# Patient Record
Sex: MALE | Race: WHITE | URBAN - METROPOLITAN AREA
[De-identification: names, ages, dates, MRNs, and addresses within clinical notes are randomized per-mention and may not be internally consistent; named-entity substitution may affect disease eponyms.]

---

## 2021-10-01 ENCOUNTER — LAB OUTSIDE AN ENCOUNTER (OUTPATIENT)
Dept: URBAN - METROPOLITAN AREA TELEHEALTH 2 | Facility: TELEHEALTH | Age: 51
End: 2021-10-01

## 2021-10-01 ENCOUNTER — OFFICE VISIT (OUTPATIENT)
Dept: URBAN - METROPOLITAN AREA CLINIC 19 | Facility: CLINIC | Age: 51
End: 2021-10-01

## 2021-10-01 ENCOUNTER — OFFICE VISIT (OUTPATIENT)
Dept: URBAN - METROPOLITAN AREA TELEHEALTH 2 | Facility: TELEHEALTH | Age: 51
End: 2021-10-01
Payer: MEDICARE

## 2021-10-01 ENCOUNTER — TELEPHONE ENCOUNTER (OUTPATIENT)
Dept: URBAN - METROPOLITAN AREA CLINIC 19 | Facility: CLINIC | Age: 51
End: 2021-10-01

## 2021-10-01 DIAGNOSIS — Z12.11 COLON CANCER SCREENING: ICD-10-CM

## 2021-10-01 PROCEDURE — 99242 OFF/OP CONSLTJ NEW/EST SF 20: CPT | Performed by: NURSE PRACTITIONER

## 2021-10-01 PROCEDURE — 993 AGA: Performed by: NURSE PRACTITIONER

## 2021-10-01 NOTE — HPI-TODAY'S VISIT:
The patient was referred by Dr. Marilynn Calderon for colon cancer screening.  A copy of this document is being forwarded to the referring provider.     Mr. Huang is a 50 year-old male who presents for a colon cancer screening. Last colonoscopy: 10 years ago in [place]. Unsure why he had it done- doesn't think he had any polyps. No family history of colon polyps or colon cancer. Patient denies nausea, heartburn, dysphagia, abdominal pain, rectal bleeding, unintentional weight loss, and loss of appetite. Has hemorrhoids. He has a bowel movement everyday, which is soft and formed. He denies pacemaker/defibrillator, diabetes, kidney disease, and home O2. Reports BMI of 36 (6' 4" and 300lb). Reports he sees cardiologist Dr. Rizo and is on Xarelto for a fib.

## 2022-05-11 ENCOUNTER — CLAIMS CREATED FROM THE CLAIM WINDOW (OUTPATIENT)
Dept: URBAN - METROPOLITAN AREA MEDICAL CENTER 25 | Facility: MEDICAL CENTER | Age: 52
End: 2022-05-11
Payer: MEDICARE

## 2022-05-11 ENCOUNTER — CLAIMS CREATED FROM THE CLAIM WINDOW (OUTPATIENT)
Dept: URBAN - METROPOLITAN AREA MEDICAL CENTER 25 | Facility: MEDICAL CENTER | Age: 52
End: 2022-05-11

## 2022-05-11 DIAGNOSIS — R19.7 ACUTE DIARRHEA: ICD-10-CM

## 2022-05-11 DIAGNOSIS — I48.91 A-FIB: ICD-10-CM

## 2022-05-11 DIAGNOSIS — R55 ATYPICAL SYNCOPE: ICD-10-CM

## 2022-05-11 DIAGNOSIS — R10.13 ABDOMINAL DISCOMFORT, EPIGASTRIC: ICD-10-CM

## 2022-05-11 DIAGNOSIS — R11.2 ACUTE NAUSEA WITH NONBILIOUS VOMITING: ICD-10-CM

## 2022-05-11 PROCEDURE — 99223 1ST HOSP IP/OBS HIGH 75: CPT | Performed by: INTERNAL MEDICINE

## 2022-05-11 PROCEDURE — G8427 DOCREV CUR MEDS BY ELIG CLIN: HCPCS | Performed by: INTERNAL MEDICINE

## 2022-05-11 PROCEDURE — 99233 SBSQ HOSP IP/OBS HIGH 50: CPT | Performed by: INTERNAL MEDICINE

## 2022-05-13 ENCOUNTER — CLAIMS CREATED FROM THE CLAIM WINDOW (OUTPATIENT)
Dept: URBAN - METROPOLITAN AREA MEDICAL CENTER 25 | Facility: MEDICAL CENTER | Age: 52
End: 2022-05-13
Payer: MEDICARE

## 2022-05-13 ENCOUNTER — OUT OF OFFICE VISIT (OUTPATIENT)
Dept: URBAN - METROPOLITAN AREA MEDICAL CENTER 25 | Facility: MEDICAL CENTER | Age: 52
End: 2022-05-13

## 2022-05-13 DIAGNOSIS — R10.13 ABDOMINAL DISCOMFORT, EPIGASTRIC: ICD-10-CM

## 2022-05-13 DIAGNOSIS — K22.70 BARRETT ESOPHAGUS: ICD-10-CM

## 2022-05-13 DIAGNOSIS — K21.9 ACID REFLUX: ICD-10-CM

## 2022-05-13 PROCEDURE — 43239 EGD BIOPSY SINGLE/MULTIPLE: CPT | Performed by: INTERNAL MEDICINE

## 2022-05-16 ENCOUNTER — OUT OF OFFICE VISIT (OUTPATIENT)
Dept: URBAN - METROPOLITAN AREA MEDICAL CENTER 25 | Facility: MEDICAL CENTER | Age: 52
End: 2022-05-16
Payer: MEDICARE

## 2022-05-16 DIAGNOSIS — R10.84 ABDOMINAL CRAMPING, GENERALIZED: ICD-10-CM

## 2022-05-16 DIAGNOSIS — R19.5 ABNORMAL CONSISTENCY OF STOOL: ICD-10-CM

## 2022-05-16 DIAGNOSIS — D62 ABLA (ACUTE BLOOD LOSS ANEMIA): ICD-10-CM

## 2022-05-16 DIAGNOSIS — K22.70 BARRETT ESOPHAGUS: ICD-10-CM

## 2022-05-16 DIAGNOSIS — R11.2 ACUTE NAUSEA WITH NONBILIOUS VOMITING: ICD-10-CM

## 2022-05-16 PROCEDURE — 99232 SBSQ HOSP IP/OBS MODERATE 35: CPT | Performed by: NURSE PRACTITIONER

## 2022-05-17 ENCOUNTER — OUT OF OFFICE VISIT (OUTPATIENT)
Dept: URBAN - METROPOLITAN AREA MEDICAL CENTER 25 | Facility: MEDICAL CENTER | Age: 52
End: 2022-05-17
Payer: MEDICARE

## 2022-05-17 DIAGNOSIS — R19.5 ABNORMAL CONSISTENCY OF STOOL: ICD-10-CM

## 2022-05-17 DIAGNOSIS — D62 ABLA (ACUTE BLOOD LOSS ANEMIA): ICD-10-CM

## 2022-05-17 PROCEDURE — 45378 DIAGNOSTIC COLONOSCOPY: CPT | Performed by: INTERNAL MEDICINE

## 2022-05-17 PROCEDURE — 91110 GI TRC IMG INTRAL ESOPH-ILE: CPT | Performed by: INTERNAL MEDICINE

## 2022-06-07 ENCOUNTER — WEB ENCOUNTER (OUTPATIENT)
Dept: URBAN - METROPOLITAN AREA TELEHEALTH 2 | Facility: TELEHEALTH | Age: 52
End: 2022-06-07

## 2022-06-10 ENCOUNTER — OFFICE VISIT (OUTPATIENT)
Dept: URBAN - METROPOLITAN AREA TELEHEALTH 2 | Facility: TELEHEALTH | Age: 52
End: 2022-06-10
Payer: MEDICARE

## 2022-06-10 ENCOUNTER — TELEPHONE ENCOUNTER (OUTPATIENT)
Dept: URBAN - METROPOLITAN AREA CLINIC 19 | Facility: CLINIC | Age: 52
End: 2022-06-10

## 2022-06-10 DIAGNOSIS — K21.9 GASTROESOPHAGEAL REFLUX DISEASE WITHOUT ESOPHAGITIS: ICD-10-CM

## 2022-06-10 DIAGNOSIS — D50.0 IRON DEFICIENCY ANEMIA DUE TO CHRONIC BLOOD LOSS: ICD-10-CM

## 2022-06-10 DIAGNOSIS — K22.70 BARRETT'S ESOPHAGUS WITHOUT DYSPLASIA: ICD-10-CM

## 2022-06-10 PROBLEM — 302914006: Status: ACTIVE | Noted: 2022-06-10

## 2022-06-10 PROBLEM — 266435005: Status: ACTIVE | Noted: 2022-06-10

## 2022-06-10 PROCEDURE — 99214 OFFICE O/P EST MOD 30 MIN: CPT | Performed by: NURSE PRACTITIONER

## 2022-06-10 RX ORDER — OMEPRAZOLE 20 MG/1
1 CAPSULE 30 MINUTES BEFORE MORNING MEAL CAPSULE, DELAYED RELEASE ORAL ONCE A DAY
Status: ACTIVE | COMMUNITY

## 2022-06-10 RX ORDER — PANTOPRAZOLE SODIUM 40 MG/1
1 TABLET TABLET, DELAYED RELEASE ORAL TWICE A DAY
Qty: 180 TABLET | Refills: 1 | OUTPATIENT
Start: 2022-06-10

## 2022-06-10 NOTE — HPI-TODAY'S VISIT:
Mr. Huang is a 51-year-old male who is following up today from his recent hospital admission at [facility].  He presented on 5/11 after several syncopal episodes.  He also reported dark diarrhea and red blood with wiping.  Hemoglobin was 12 in March and on admission it was 8.  Patient is on Xarelto for A. fib and occasionally takes ibuprofen/Aleve.  Patient was noted to have heme positive stools.  Patient did report epigastric pain and GERD.  Labs on admission WBC 14.2, Hgb 8.6, HCT 29.8, MCV 67.6, PT 16.5, INR 1.4, bilirubin 0.2, alk phos 136, AST 14, ALT 13.  Hemoglobin at discharge was 7.7 on 5/18. Patient had an EGD with Dr. 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  Biopsied.  Normal stomach and normal examined duodenum.  Advised patient to start Protonix 40 mg daily.  Pathology revealed gastroesophageal junction biopsy with patchy active inflammation and intestinal type (goblet cell) metaplasia.  Negative for dysplasia.  Patient had a colonoscopy on 5/17/2022 with Dr. Espitia-diverticulosis in the sigmoid, descending, and transverse colon.  Internal hemorrhoids.  The examined portion of the ileum was normal. Patient had a PillCam on 5/15/2022 that was read by Dr. Espitia-a tiny nonspecific 1 mm red spot was visualized of doubtful clinical significance.  The PillCam was otherwise unremarkable.  Advised to monitor hemoglobin. Today, patient reports he is feeling much better although he complains of memory loss.  Patient reports he had dark diarrhea for couple months on and off for weeks at a time.  Patient reports he has not seen any since leaving the hospital.  Patient reports he has not had any blood work since leaving the hospital.  Patient states he was advised at discharged to take iron although he reports he had GI upset with it.  Patient is not taking iron now.  Patient reports he currently is having loose to formed bowel movements daily.  Patient was taking Prilosec 40 mg daily prior to going to the hospital and that is what he reports he was taking when he left as well.  Patient also reports he was advised seeing a hematologist but he has not yet.

## 2022-06-28 LAB
FERRITIN, SERUM: 11
HEMATOCRIT: 40.1
HEMOGLOBIN: 11.1
IRON BIND.CAP.(TIBC): 449
IRON SATURATION: 5
IRON: 22
MCH: 20
MCHC: 27.7
MCV: 72
NRBC: (no result)
PLATELETS: 501
RBC: 5.56
RDW: 21.1
UIBC: 427
WBC: 9.5

## 2022-12-06 ENCOUNTER — OUT OF OFFICE VISIT (OUTPATIENT)
Dept: URBAN - METROPOLITAN AREA MEDICAL CENTER 25 | Facility: MEDICAL CENTER | Age: 52
End: 2022-12-06
Payer: MEDICARE

## 2022-12-06 DIAGNOSIS — R10.84 ABDOMINAL CRAMPING, GENERALIZED: ICD-10-CM

## 2022-12-06 DIAGNOSIS — K62.5 ANAL BLEEDING: ICD-10-CM

## 2022-12-06 DIAGNOSIS — R19.7 ACUTE DIARRHEA: ICD-10-CM

## 2022-12-06 DIAGNOSIS — D50.9 ANEMIA: ICD-10-CM

## 2022-12-06 PROCEDURE — 99223 1ST HOSP IP/OBS HIGH 75: CPT | Performed by: STUDENT IN AN ORGANIZED HEALTH CARE EDUCATION/TRAINING PROGRAM

## 2022-12-06 PROCEDURE — G8427 DOCREV CUR MEDS BY ELIG CLIN: HCPCS | Performed by: STUDENT IN AN ORGANIZED HEALTH CARE EDUCATION/TRAINING PROGRAM

## 2022-12-07 ENCOUNTER — OUT OF OFFICE VISIT (OUTPATIENT)
Dept: URBAN - METROPOLITAN AREA MEDICAL CENTER 25 | Facility: MEDICAL CENTER | Age: 52
End: 2022-12-07
Payer: MEDICARE

## 2022-12-07 DIAGNOSIS — K22.70 BARRETT ESOPHAGUS: ICD-10-CM

## 2022-12-07 DIAGNOSIS — K31.89 ACQUIRED DEFORMITY OF DUODENUM: ICD-10-CM

## 2022-12-07 DIAGNOSIS — K92.1 ACUTE MELENA: ICD-10-CM

## 2022-12-07 DIAGNOSIS — D50.0 1. ANEMIA, IRON DEFICIENCY FROM CHRONIC BLOOD LOSS:: ICD-10-CM

## 2022-12-07 PROCEDURE — 43239 EGD BIOPSY SINGLE/MULTIPLE: CPT | Performed by: STUDENT IN AN ORGANIZED HEALTH CARE EDUCATION/TRAINING PROGRAM

## 2022-12-07 PROCEDURE — 45330 DIAGNOSTIC SIGMOIDOSCOPY: CPT | Performed by: STUDENT IN AN ORGANIZED HEALTH CARE EDUCATION/TRAINING PROGRAM

## 2022-12-08 ENCOUNTER — OUT OF OFFICE VISIT (OUTPATIENT)
Dept: URBAN - METROPOLITAN AREA MEDICAL CENTER 25 | Facility: MEDICAL CENTER | Age: 52
End: 2022-12-08
Payer: MEDICARE

## 2022-12-08 DIAGNOSIS — K92.1 ACUTE MELENA: ICD-10-CM

## 2022-12-08 DIAGNOSIS — D50.9 ANEMIA: ICD-10-CM

## 2022-12-08 PROCEDURE — 91110 GI TRC IMG INTRAL ESOPH-ILE: CPT | Performed by: STUDENT IN AN ORGANIZED HEALTH CARE EDUCATION/TRAINING PROGRAM

## 2023-01-05 ENCOUNTER — OFFICE VISIT (OUTPATIENT)
Dept: URBAN - METROPOLITAN AREA TELEHEALTH 2 | Facility: TELEHEALTH | Age: 53
End: 2023-01-05

## 2023-01-05 ENCOUNTER — OFFICE VISIT (OUTPATIENT)
Dept: URBAN - METROPOLITAN AREA TELEHEALTH 2 | Facility: TELEHEALTH | Age: 53
End: 2023-01-05
Payer: MEDICARE

## 2023-01-05 DIAGNOSIS — D50.8 ACQUIRED IRON DEFICIENCY ANEMIA DUE TO DECREASED ABSORPTION: ICD-10-CM

## 2023-01-05 DIAGNOSIS — K64.9 HEMORRHOIDS: ICD-10-CM

## 2023-01-05 DIAGNOSIS — K92.1 HEMATOCHEZIA: ICD-10-CM

## 2023-01-05 PROCEDURE — 99213 OFFICE O/P EST LOW 20 MIN: CPT | Performed by: INTERNAL MEDICINE

## 2023-01-05 RX ORDER — OMEPRAZOLE 20 MG/1
1 CAPSULE 30 MINUTES BEFORE MORNING MEAL CAPSULE, DELAYED RELEASE ORAL ONCE A DAY
Status: ACTIVE | COMMUNITY

## 2023-01-05 RX ORDER — PANTOPRAZOLE SODIUM 40 MG/1
1 TABLET TABLET, DELAYED RELEASE ORAL TWICE A DAY
Qty: 180 TABLET | Refills: 1 | Status: ACTIVE | COMMUNITY
Start: 2022-06-10

## 2023-01-05 NOTE — HPI-TODAY'S VISIT:
6/10/22 (Verónica Castillo, CARLOS): Mr. Huang is a 51-year-old male who is following up today from his recent hospital admission at [facility].  He presented on 5/11 after several syncopal episodes.  He also reported dark diarrhea and red blood with wiping.  Hemoglobin was 12 in March and on admission it was 8.  Patient is on Xarelto for A. fib and occasionally takes ibuprofen/Aleve.  Patient was noted to have heme positive stools.  Patient did report epigastric pain and GERD.  Labs on admission WBC 14.2, Hgb 8.6, HCT 29.8, MCV 67.6, PT 16.5, INR 1.4, bilirubin 0.2, alk phos 136, AST 14, ALT 13.  Hemoglobin at discharge was 7.7 on 5/18. Patient had an EGD with Dr. 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  Biopsied.  Normal stomach and normal examined duodenum.  Advised patient to start Protonix 40 mg daily.  Pathology revealed gastroesophageal junction biopsy with patchy active inflammation and intestinal type (goblet cell) metaplasia.  Negative for dysplasia.  Patient had a colonoscopy on 5/17/2022 with Dr. Espitia-diverticulosis in the sigmoid, descending, and transverse colon.  Internal hemorrhoids.  The examined portion of the ileum was normal. Patient had a PillCam on 5/15/2022 that was read by Dr. Espitia-a tiny nonspecific 1 mm red spot was visualized of doubtful clinical significance.  The PillCam was otherwise unremarkable.  Advised to monitor hemoglobin. Today, patient reports he is feeling much better although he complains of memory loss.  Patient reports he had dark diarrhea for couple months on and off for weeks at a time.  Patient reports he has not seen any since leaving the hospital.  Patient reports he has not had any blood work since leaving the hospital.  Patient states he was advised at discharged to take iron although he reports he had GI upset with it.  Patient is not taking iron now.  Patient reports he currently is having loose to formed bowel movements daily.  Patient was taking Prilosec 40 mg daily prior to going to the hospital and that is what he reports he was taking when he left as well.  Patient also reports he was advised seeing a hematologist but he has not yet.  1/5/23: Patient presents via Telehealth at home for reevaluation of his iron deficiency anemia. He eventually saw Dr. Marvel Perez on 12/5/22 and was found to be anemic again to 8 g/dL. He had been complaining of intermittent hematochezia, so Dr. Perez sent him to the [facility] ED for emergent evaluation. The patient today says that he would sometimes just have blood mixed in the stool, but on occasion, the blood would "fill the toilet". When seen in the [facility], my colleagues performed a repeat EGD, flexible sigmoidoscopy, and PillCam, all of which did not show any active bleeding. He was eventually discharged but showed up again at Dr. Perez's office later in the month and had severe anemia again, so he was sent back to the [facility] - this time, he was told that he had hemorrhoidal bleeding exacerbated by his anticoagulation. He was told to follow up with colorectal surgery to manage his hemorrhoids while holding his anticoagulant. He has had no bleeding since being discharged.

## 2023-01-05 NOTE — HPI-TODAY'S VISIT:
Mr. Huang is a 51-year-old male who is following up today from his recent hospital admission at [facility].  He presented on 5/11 after several syncopal episodes.  He also reported dark diarrhea and red blood with wiping.  Hemoglobin was 12 in March and on admission it was 8.  Patient is on Xarelto for A. fib and occasionally takes ibuprofen/Aleve.  Patient was noted to have heme positive stools.  Patient did report epigastric pain and GERD.  Labs on admission WBC 14.2, Hgb 8.6, HCT 29.8, MCV 67.6, PT 16.5, INR 1.4, bilirubin 0.2, alk phos 136, AST 14, ALT 13.  Hemoglobin at discharge was 7.7 on 5/18. Patient had an EGD with Dr. 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  Biopsied.  Normal stomach and normal examined duodenum.  Advised patient to start Protonix 40 mg daily.  Pathology revealed gastroesophageal junction biopsy with patchy active inflammation and intestinal type (goblet cell) metaplasia.  Negative for dysplasia.  Patient had a colonoscopy on 5/17/2022 with Dr. Espitia-diverticulosis in the sigmoid, descending, and transverse colon.  Internal hemorrhoids.  The examined portion of the ileum was normal. Patient had a PillCam on 5/15/2022 that was read by Dr. Espitia-a tiny nonspecific 1 mm red spot was visualized of doubtful clinical significance.  The PillCam was otherwise unremarkable.  Advised to monitor hemoglobin. Today, patient reports he is feeling much better although he complains of memory loss.  Patient reports he had dark diarrhea for couple months on and off for weeks at a time.  Patient reports he has not seen any since leaving the hospital.  Patient reports he has not had any blood work since leaving the hospital.  Patient states he was advised at discharged to take iron although he reports he had GI upset with it.  Patient is not taking iron now.  Patient reports he currently is having loose to formed bowel movements daily.  Patient was taking Prilosec 40 mg daily prior to going to the hospital and that is what he reports he was taking when he left as well.  Patient also reports he was advised seeing a hematologist but he has not yet.

## 2023-01-05 NOTE — PHYSICAL EXAM CHEST:
no lesions,  no deformities,  no traumatic injuries, chest wall non-tender/no masses present (per patient self-exam), breathing is unlabored without accessory muscle use

## 2023-01-06 ENCOUNTER — TELEPHONE ENCOUNTER (OUTPATIENT)
Dept: URBAN - METROPOLITAN AREA CLINIC 19 | Facility: CLINIC | Age: 53
End: 2023-01-06

## 2023-01-06 PROBLEM — 711150003 LONG-TERM CURRENT USE OF ANTICOAGULANT: Status: ACTIVE | Noted: 2023-01-06

## 2023-01-06 PROBLEM — 724556004: Status: ACTIVE | Noted: 2022-06-10

## 2023-01-06 PROBLEM — 405729008 HEMATOCHEZIA: Status: ACTIVE | Noted: 2023-01-06

## 2023-01-06 PROBLEM — 70153002 HEMORRHOIDS: Status: ACTIVE | Noted: 2023-01-06

## 2023-02-22 ENCOUNTER — WEB ENCOUNTER (OUTPATIENT)
Dept: URBAN - METROPOLITAN AREA CLINIC 19 | Facility: CLINIC | Age: 53
End: 2023-02-22

## 2023-02-28 ENCOUNTER — OFFICE VISIT (OUTPATIENT)
Dept: URBAN - METROPOLITAN AREA CLINIC 19 | Facility: CLINIC | Age: 53
End: 2023-02-28

## 2023-02-28 RX ORDER — PANTOPRAZOLE SODIUM 40 MG/1
1 TABLET TABLET, DELAYED RELEASE ORAL TWICE A DAY
Qty: 180 TABLET | Refills: 1 | Status: ACTIVE | COMMUNITY
Start: 2022-06-10

## 2023-02-28 RX ORDER — OMEPRAZOLE 20 MG/1
1 CAPSULE 30 MINUTES BEFORE MORNING MEAL CAPSULE, DELAYED RELEASE ORAL ONCE A DAY
Status: ACTIVE | COMMUNITY

## 2023-03-06 ENCOUNTER — OFFICE VISIT (OUTPATIENT)
Dept: URBAN - METROPOLITAN AREA CLINIC 19 | Facility: CLINIC | Age: 53
End: 2023-03-06

## 2023-03-08 ENCOUNTER — TELEPHONE ENCOUNTER (OUTPATIENT)
Dept: URBAN - METROPOLITAN AREA CLINIC 19 | Facility: CLINIC | Age: 53
End: 2023-03-08

## 2023-03-08 RX ORDER — PANTOPRAZOLE SODIUM 40 MG/1
1 TABLET TABLET, DELAYED RELEASE ORAL TWICE A DAY
Qty: 180 TABLET | Refills: 3
Start: 2022-06-10

## 2023-05-05 ENCOUNTER — OFFICE VISIT (OUTPATIENT)
Dept: URBAN - METROPOLITAN AREA CLINIC 19 | Facility: CLINIC | Age: 53
End: 2023-05-05
Payer: MEDICARE

## 2023-05-05 ENCOUNTER — WEB ENCOUNTER (OUTPATIENT)
Dept: URBAN - METROPOLITAN AREA CLINIC 19 | Facility: CLINIC | Age: 53
End: 2023-05-05

## 2023-05-05 VITALS
HEIGHT: 75 IN | TEMPERATURE: 97.4 F | WEIGHT: 313 LBS | DIASTOLIC BLOOD PRESSURE: 88 MMHG | BODY MASS INDEX: 38.92 KG/M2 | SYSTOLIC BLOOD PRESSURE: 138 MMHG

## 2023-05-05 DIAGNOSIS — K64.1 GRADE II HEMORRHOIDS: ICD-10-CM

## 2023-05-05 DIAGNOSIS — D50.0 IRON DEFICIENCY ANEMIA DUE TO CHRONIC BLOOD LOSS: ICD-10-CM

## 2023-05-05 DIAGNOSIS — Z79.01 ANTICOAGULANT LONG-TERM USE: ICD-10-CM

## 2023-05-05 PROCEDURE — 99213 OFFICE O/P EST LOW 20 MIN: CPT | Performed by: INTERNAL MEDICINE

## 2023-05-05 PROCEDURE — 46221 LIGATION OF HEMORRHOID(S): CPT | Performed by: INTERNAL MEDICINE

## 2023-05-05 RX ORDER — OMEPRAZOLE 20 MG/1
1 CAPSULE 30 MINUTES BEFORE MORNING MEAL CAPSULE, DELAYED RELEASE ORAL ONCE A DAY
Status: ACTIVE | COMMUNITY

## 2023-05-05 RX ORDER — PANTOPRAZOLE SODIUM 40 MG/1
1 TABLET TABLET, DELAYED RELEASE ORAL TWICE A DAY
Qty: 180 TABLET | Refills: 3 | Status: ACTIVE | COMMUNITY
Start: 2022-06-10

## 2023-05-05 NOTE — HPI-TODAY'S VISIT:
6/10/22 (Verónica Castillo, CARLOS): Mr. Huang is a 51-year-old male who is following up today from his recent hospital admission at [facility].  He presented on 5/11 after several syncopal episodes.  He also reported dark diarrhea and red blood with wiping.  Hemoglobin was 12 in March and on admission it was 8.  Patient is on Xarelto for A. fib and occasionally takes ibuprofen/Aleve.  Patient was noted to have heme positive stools.  Patient did report epigastric pain and GERD.  Labs on admission WBC 14.2, Hgb 8.6, HCT 29.8, MCV 67.6, PT 16.5, INR 1.4, bilirubin 0.2, alk phos 136, AST 14, ALT 13.  Hemoglobin at discharge was 7.7 on 5/18. Patient had an EGD with Dr. 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  Biopsied.  Normal stomach and normal examined duodenum.  Advised patient to start Protonix 40 mg daily.  Pathology revealed gastroesophageal junction biopsy with patchy active inflammation and intestinal type (goblet cell) metaplasia.  Negative for dysplasia.  Patient had a colonoscopy on 5/17/2022 with Dr. Espitia-diverticulosis in the sigmoid, descending, and transverse colon.  Internal hemorrhoids.  The examined portion of the ileum was normal. Patient had a PillCam on 5/15/2022 that was read by Dr. Espitia-a tiny nonspecific 1 mm red spot was visualized of doubtful clinical significance.  The PillCam was otherwise unremarkable.  Advised to monitor hemoglobin. Today, patient reports he is feeling much better although he complains of memory loss.  Patient reports he had dark diarrhea for couple months on and off for weeks at a time.  Patient reports he has not seen any since leaving the hospital.  Patient reports he has not had any blood work since leaving the hospital.  Patient states he was advised at discharged to take iron although he reports he had GI upset with it.  Patient is not taking iron now.  Patient reports he currently is having loose to formed bowel movements daily.  Patient was taking Prilosec 40 mg daily prior to going to the hospital and that is what he reports he was taking when he left as well.  Patient also reports he was advised seeing a hematologist but he has not yet.  1/5/23: Patient presents via Telehealth at home for reevaluation of his iron deficiency anemia. He eventually saw Dr. Marvel Perez on 12/5/22 and was found to be anemic again to 8 g/dL. He had been complaining of intermittent hematochezia, so Dr. Perez sent him to the [facility] ED for emergent evaluation. The patient today says that he would sometimes just have blood mixed in the stool, but on occasion, the blood would "fill the toilet". When seen in the [facility], my colleagues performed a repeat EGD, flexible sigmoidoscopy, and PillCam, all of which did not show any active bleeding. He was eventually discharged but showed up again at Dr. Perez's office later in the month and had severe anemia again, so he was sent back to the [facility] - this time, he was told that he had hemorrhoidal bleeding exacerbated by his anticoagulation. He was told to follow up with colorectal surgery to manage his hemorrhoids while holding his anticoagulant. He has had no bleeding since being discharged.  5/5/23: Patient returns for reevaluation - he never went to see a colorectal surgeon as recommended. He is now being considered for the Watchman procedure, and he is concerned about becoming anemic again on blood thinners. There is a thought that his hemorrhoids are contributing the most to his anemia - we agreed that I would perform a thorough exam today to see if he is a candidate for banding.

## 2023-06-02 ENCOUNTER — DASHBOARD ENCOUNTERS (OUTPATIENT)
Age: 53
End: 2023-06-02

## 2023-06-05 ENCOUNTER — OFFICE VISIT (OUTPATIENT)
Dept: URBAN - METROPOLITAN AREA CLINIC 19 | Facility: CLINIC | Age: 53
End: 2023-06-05

## 2023-06-05 RX ORDER — OMEPRAZOLE 20 MG/1
1 CAPSULE 30 MINUTES BEFORE MORNING MEAL CAPSULE, DELAYED RELEASE ORAL ONCE A DAY
Status: ACTIVE | COMMUNITY

## 2023-06-05 RX ORDER — PANTOPRAZOLE SODIUM 40 MG/1
1 TABLET TABLET, DELAYED RELEASE ORAL TWICE A DAY
Qty: 180 TABLET | Refills: 3 | Status: ACTIVE | COMMUNITY
Start: 2022-06-10

## 2023-12-22 ENCOUNTER — ERX REFILL RESPONSE (OUTPATIENT)
Dept: URBAN - METROPOLITAN AREA CLINIC 19 | Facility: CLINIC | Age: 53
End: 2023-12-22

## 2023-12-22 RX ORDER — PANTOPRAZOLE SODIUM 40 MG/1
TAKE 1 TABLET BY MOUTH TWICE DAILY TABLET, DELAYED RELEASE ORAL
Qty: 180 TABLET | Refills: 3 | OUTPATIENT

## 2023-12-22 RX ORDER — PANTOPRAZOLE SODIUM 40 MG/1
1 TABLET TABLET, DELAYED RELEASE ORAL TWICE A DAY
Qty: 180 TABLET | Refills: 3 | OUTPATIENT

## 2025-04-25 ENCOUNTER — LAB OUTSIDE AN ENCOUNTER (OUTPATIENT)
Dept: URBAN - METROPOLITAN AREA CLINIC 19 | Facility: CLINIC | Age: 55
End: 2025-04-25

## 2025-04-25 ENCOUNTER — OFFICE VISIT (OUTPATIENT)
Dept: URBAN - METROPOLITAN AREA CLINIC 19 | Facility: CLINIC | Age: 55
End: 2025-04-25
Payer: MEDICARE

## 2025-04-25 DIAGNOSIS — K22.70 BARRETT'S ESOPHAGUS WITHOUT DYSPLASIA: ICD-10-CM

## 2025-04-25 PROCEDURE — 99203 OFFICE O/P NEW LOW 30 MIN: CPT

## 2025-04-25 PROCEDURE — 99213 OFFICE O/P EST LOW 20 MIN: CPT

## 2025-04-25 RX ORDER — PANTOPRAZOLE SODIUM 40 MG/1
TAKE 1 TABLET BY MOUTH TWICE DAILY TABLET, DELAYED RELEASE ORAL
Qty: 180 TABLET | Refills: 3 | Status: ACTIVE | COMMUNITY

## 2025-04-25 RX ORDER — OMEPRAZOLE 20 MG/1
1 CAPSULE 30 MINUTES BEFORE MORNING MEAL CAPSULE, DELAYED RELEASE ORAL ONCE A DAY
Status: ACTIVE | COMMUNITY

## 2025-04-25 NOTE — HPI-TODAY'S VISIT:
Mr. Huang is a 54-year-old male with history of HTN, afib s/p Watchman, and Wagoner's esophagus returns today for follow-up.  Last seen by Dr. Deluna on 5/2023 for hemorrhoid banding.   He is on pantoprazole daily. Reports occasional dysphagia to solids and liquids. No N/V or heartburn. Has cut down on cigarettes from 3PPD to half. Having daily normal bowel movements. His MGF had colon cancer  Previous procedures: 5/2022 EGD with Dr. 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  Stomach biopsies negative for H. pylori and GE junction consistent with intestinal metaplasia.  Negative for dysplasia. 5/2022 colonoscopy with Dr. Espitia noted diverticulosis in the sigmoid, descending and transverse colon.  Internal hemorrhoids otherwise normal exam with no specimens collected. 12/2022 flex sig by Dr. Pham noted external hemorrhoids and skin tags.  Otherwise normal exam with no specimens collected. 12/2022 PillCam with no source of GI bleed noted.